# Patient Record
Sex: MALE | Race: WHITE | Employment: FULL TIME | URBAN - METROPOLITAN AREA
[De-identification: names, ages, dates, MRNs, and addresses within clinical notes are randomized per-mention and may not be internally consistent; named-entity substitution may affect disease eponyms.]

---

## 2018-07-13 ENCOUNTER — HOSPITAL ENCOUNTER (EMERGENCY)
Age: 33
Discharge: HOME | End: 2018-07-13
Attending: EMERGENCY MEDICINE
Payer: SELF-PAY

## 2018-07-13 VITALS
HEART RATE: 78 BPM | SYSTOLIC BLOOD PRESSURE: 126 MMHG | DIASTOLIC BLOOD PRESSURE: 95 MMHG | HEIGHT: 69 IN | WEIGHT: 192 LBS | RESPIRATION RATE: 16 BRPM | TEMPERATURE: 98.3 F | BODY MASS INDEX: 28.44 KG/M2 | OXYGEN SATURATION: 100 %

## 2018-07-13 DIAGNOSIS — F41.1 ANXIETY, GENERALIZED: Primary | ICD-10-CM

## 2018-07-13 DIAGNOSIS — R45.89 DEPRESSED MOOD: ICD-10-CM

## 2018-07-13 DIAGNOSIS — F10.10 ALCOHOL ABUSE: ICD-10-CM

## 2018-07-13 PROCEDURE — 99281 EMR DPT VST MAYX REQ PHY/QHP: CPT

## 2018-07-13 NOTE — PROGRESS NOTES
Asked to speak to patient regarding resources but pt had already left. Will attempt to contact the patient at the home number on the face sheet.     Melissa Guzman RN  Outcomes manager  [phone]

## 2018-07-13 NOTE — ED NOTES
Attempted to call patient to room, patient not in [address], called and left message to call ER

## 2018-07-13 NOTE — ED PROVIDER NOTES
EMERGENCY DEPARTMENT HISTORY AND PHYSICAL EXAM    12:42 PM      Date: 7/13/2018  Patient Name: Dorcas Dutton    History of Presenting Illness     Chief Complaint   Patient presents with    Anxiety    Medication Reaction         History Provided By: Patient    Chief Complaint: Anxiety/ Medication Reaction  Duration:  6AM Today   Timing:  Constant  Location: N/A  Quality: N/A  Severity: Patient denies having any pain. Modifying Factors: Patient states that he borrowed Propanolol from his sister for his anxiety. States that he took 20mg at 6AM, and 20mg again at 8AM.   Associated Symptoms:  Patient denies other associated symptoms, such as SI/HI. Additional History (Context): Dorcas Dutton is a 33 y.o. male with PMHx of Anxiety and Panic attack who presents with concern for a medication reaction onset Today 6AM. Patient states that he borrowed Propanolol from his sister for his anxiety. States that he took 20mg at 6AM, and 20mg again at [address]. Patient states he also drank EtOH, and was concerned for hypotension and a medication reaction with the alcohol. Patient states that he experiences chronic anxiety since he was 17years old (approximate). Patient is unsure of his anxiety triggers. Patient reports EtOH use when feeling anxious, and would like resources to control his anxiety. Patient is also concerned for alcoholism. States that he has not been evaluated by [facility] or other services for his anxiety. Patient denies other associated symptoms, such as SI/HI. No other concerns were expressed at this time. As the patient is without physical symptoms or complaints of pain, there is no location of pain, severity of pain, quality of pain, duration, modifying factors, or associated signs and symptoms regarding the pt's presenting complaint.       PCP: None        Past History     Past Medical History:  Past Medical History:   Diagnosis Date    Anxiety     Panic attack        Past Surgical History:  Past Surgical History:   Procedure Laterality Date    ABDOMEN SURGERY PROC UNLISTED         Family History:  History reviewed. No pertinent family history. Social History:  Social History   Substance Use Topics    Smoking status: Never Smoker    Smokeless tobacco: Never Used    Alcohol use No       Allergies:  No Known Allergies      Review of Systems       Review of Systems   Constitutional: Negative for chills and fever. HENT: Negative for ear pain and sore throat. Eyes: Negative for pain and visual disturbance. Respiratory: Negative for cough and shortness of breath. Cardiovascular: Negative for chest pain and palpitations. Gastrointestinal: Negative for abdominal pain, diarrhea, nausea and vomiting. Genitourinary: Negative for flank pain. Musculoskeletal: Negative for back pain and neck pain. Neurological: Negative for syncope and headaches. Psychiatric/Behavioral: Negative for agitation and suicidal ideas. The patient is nervous/anxious. (-) HI          Physical Exam     Visit Vitals    BP (!) 126/95 (BP 1 Location: Right arm, BP Patient Position: At rest)    Pulse 78    Temp 98.3 °F (36.8 °C)    Resp 16    Ht 5' 9\" (1.753 m)    Wt 87.1 kg (192 lb)    SpO2 100%    BMI 28.35 kg/m2         Physical Exam   Constitutional: He is oriented to person, place, and time. He appears well-developed and well-nourished. HENT:   Head: Normocephalic and atraumatic. Mouth/Throat: Oropharynx is clear and moist.   Eyes: Pupils are equal, round, and reactive to light. No scleral icterus. Neck: Neck supple. No tracheal deviation present. Cardiovascular: Regular rhythm. No murmur heard. Pulmonary/Chest: Effort normal and breath sounds normal. No respiratory distress. Abdominal: Soft. There is no tenderness. Musculoskeletal: Normal range of motion. He exhibits no deformity. Neurological: He is alert and oriented to person, place, and time.    No gross neuro deficit   Skin: Skin is warm and dry. No rash noted. He is not diaphoretic. Psychiatric: He exhibits a depressed mood. Patient has decreased eye contact    Nursing note and vitals reviewed. Diagnostic Study Results     Labs -  Labs Reviewed - No data to display    Radiologic Studies -   No orders to display         Medical Decision Making   I am the first provider for this patient. I reviewed the vital signs, available nursing notes, past medical history, past surgical history, family history and social history. Vital Signs-Reviewed the patient's vital signs. Pulse Oximetry Analysis -  100% on room air (Interpretation)    Records Reviewed: Nursing Notes and Triage notes  (Time of Review: 12:42 PM)      MDM, Progress Notes, Reevaluation, and Consults:    DDX: Generalized Anxiety,  Depression, Alcohol abuse     Medical Decision Making (MDM): 33year-old male presents with chronic history of generalized anxiety, and self-medicating with EtOH and friend's Propanolol. He was concerned today after reading about Propanolol on [employer]. The patient denies SI/HI. He has a depressed mood on exam, but no other focal abnormalities. I discussed [facility] resources, and consulted case management. Patient had normal vitals and otherwise a normal physical exam. Prior to case management seeing the patient, he left the Emergency Department. ED Course     1:04 PM  I spoke with Case management, who will speak with the patient and give information for [facility]. Pt left ED before CM could talk to him. The patient was given:  Medications - No data to display      Diagnosis     Clinical Impression:   1. Anxiety, generalized    2. Depressed mood    3.  Alcohol abuse        Disposition: Discharged     Follow-up Information     Follow up With Details Comments Contact Info    [employer] Schedule an appointment as soon as possible for a visit Substance Abuse Treatment [address] [address] [facility]) [phone] Patient's Medications   Start Taking    No medications on file   Continue Taking    No medications on file   These Medications have changed    No medications on file   Stop Taking    ALPRAZOLAM (XANAX) 0.5 MG TABLET    Take 0.5 mg by mouth three (3) times daily as needed. OMEPRAZOLE (PRILOSEC) 20 MG CAPSULE    Take 20 mg by mouth daily. PAROXETINE (PAXIL) 20 MG TABLET    Take 20 mg by mouth daily. ZOLPIDEM (AMBIEN) 5 MG TABLET    Take 5 mg by mouth nightly as needed. _______________________________  Scribe Jayshree Farnsworth acting as a scribe for and in the presence of Yesenia Antonio DO      July 13, 2018 at 12:42 PM       Provider Attestation:      I personally performed the services described in the documentation, reviewed the documentation, as recorded by the scribe in my presence, and it accurately and completely records my words and actions.  July 13, 2018 at 12:42 PM - Yesenia Antonio DO

## 2018-07-13 NOTE — DISCHARGE INSTRUCTIONS
Learning About Alcohol Misuse  What is alcohol misuse? Alcohol misuse means drinking so much that it causes problems for you or others. Early problems with alcohol can start at home. You may argue with loved ones about how much you're drinking. Your job may be affected because of drinking. You may drink when it's dangerous or illegal, such as when you drive. Drinking too much for a long time can lead to health conditions like high blood pressure and liver problems. What are the symptoms? Symptoms of alcohol misuse may include:  · Drinking much more than you planned. · Drinking even though it's causing problems for you or others. · Putting yourself in situations where you might get hurt. · Wanting to cut down or stop drinking, but not being able to. · Feeling guilty about how much you're drinking. How is alcohol misuse treated? Getting help for problems with alcohol is up to you. But you don't have to do it alone. There are many people and kinds of treatments to help with alcohol problems. Talking to your doctor is the first step. When you get a doctor's help, treatment for alcohol problems can be safer and quicker. Treatment options can include:  · Treatment programs. Examples are group therapy, one or more types of counseling, and alcohol education. · Medicines. A doctor or counselor can help you know what kinds of medicines might help with cravings. · Free social support groups. These groups include AA (Alcoholics Anonymous) and SMART (Self-Management and Recovery Training). Your doctor can help you decide which type of program is best for you. Follow-up care is a key part of your treatment and safety. Be sure to make and go to all appointments, and call your doctor if you are having problems. It's also a good idea to know your test results and keep a list of the medicines you take. Where can you learn more? Go to http://iat-brenda.info/.   Enter [id number] in the search box to learn more about \"Learning About Alcohol Misuse. \"  Current as of: October 9, 2017  Content Version: 11.7  © 7413-9182 "[employer]. Care instructions adapted under license by [employer] (which disclaims liability or warranty for this information). If you have questions about a medical condition or this instruction, always ask your healthcare professional. [address] any warranty or liability for your use of this information.